# Patient Record
Sex: FEMALE | Race: BLACK OR AFRICAN AMERICAN | NOT HISPANIC OR LATINO | Employment: FULL TIME | URBAN - METROPOLITAN AREA
[De-identification: names, ages, dates, MRNs, and addresses within clinical notes are randomized per-mention and may not be internally consistent; named-entity substitution may affect disease eponyms.]

---

## 2019-05-16 PROBLEM — H40.1133 PRIMARY OPEN ANGLE GLAUCOMA (POAG) OF BOTH EYES, SEVERE STAGE: Status: ACTIVE | Noted: 2019-05-16

## 2019-05-22 PROBLEM — I50.22 CHRONIC SYSTOLIC HEART FAILURE: Status: ACTIVE | Noted: 2019-05-22

## 2019-11-22 PROBLEM — K21.9 GASTROESOPHAGEAL REFLUX DISEASE: Status: ACTIVE | Noted: 2019-11-22

## 2019-11-22 PROBLEM — I10 HYPERTENSION: Status: ACTIVE | Noted: 2019-11-22

## 2020-03-20 PROBLEM — E78.5 DYSLIPIDEMIA: Status: ACTIVE | Noted: 2020-03-20

## 2020-10-17 PROBLEM — I50.21 ACUTE SYSTOLIC CONGESTIVE HEART FAILURE: Status: ACTIVE | Noted: 2019-05-22

## 2020-10-17 PROBLEM — H05.019 CELLULITIS OF ORBIT: Status: ACTIVE | Noted: 2018-03-08

## 2020-10-17 PROBLEM — N95.1 MENOPAUSAL VASOMOTOR SYNDROME: Status: ACTIVE | Noted: 2018-03-05

## 2020-10-17 PROBLEM — N95.2 ATROPHIC VAGINITIS: Status: ACTIVE | Noted: 2018-03-05

## 2020-10-17 PROBLEM — H05.019 CELLULITIS OF ORBIT: Status: RESOLVED | Noted: 2018-03-08 | Resolved: 2020-10-17

## 2020-10-17 PROBLEM — R94.39 ABNORMAL STRESS TEST: Status: ACTIVE | Noted: 2020-10-17

## 2020-10-17 PROBLEM — I50.21 ACUTE SYSTOLIC CONGESTIVE HEART FAILURE: Status: RESOLVED | Noted: 2019-05-22 | Resolved: 2020-10-17

## 2020-10-17 PROBLEM — Z95.810 ICD (IMPLANTABLE CARDIOVERTER-DEFIBRILLATOR) IN PLACE: Status: ACTIVE | Noted: 2020-10-17

## 2020-10-17 PROBLEM — L03.213 PRESEPTAL CELLULITIS: Status: ACTIVE | Noted: 2020-10-17

## 2020-10-17 PROBLEM — R00.1 BRADYCARDIA: Status: ACTIVE | Noted: 2020-10-17

## 2020-10-17 PROBLEM — R05.9 COUGH: Status: ACTIVE | Noted: 2020-10-17

## 2020-10-17 PROBLEM — R05.9 COUGH: Status: RESOLVED | Noted: 2020-10-17 | Resolved: 2020-10-17

## 2020-10-17 PROBLEM — R00.1 BRADYCARDIA: Status: RESOLVED | Noted: 2020-10-17 | Resolved: 2020-10-17

## 2020-10-17 PROBLEM — I50.9 CHF (CONGESTIVE HEART FAILURE): Status: ACTIVE | Noted: 2017-10-10

## 2020-10-17 PROBLEM — I42.9 CARDIOMYOPATHY: Status: ACTIVE | Noted: 2020-10-17

## 2020-10-17 PROBLEM — I50.9 CHF EXACERBATION: Status: ACTIVE | Noted: 2020-10-17

## 2020-10-18 PROBLEM — R10.9 ABDOMINAL PAIN: Status: ACTIVE | Noted: 2020-10-18

## 2020-10-20 PROBLEM — N17.9 AKI (ACUTE KIDNEY INJURY): Status: ACTIVE | Noted: 2020-10-20

## 2020-12-19 PROBLEM — H25.813 COMBINED FORMS OF AGE-RELATED CATARACT OF BOTH EYES: Status: ACTIVE | Noted: 2020-12-19

## 2020-12-19 PROBLEM — H40.1112 PRIMARY OPEN ANGLE GLAUCOMA (POAG) OF RIGHT EYE, MODERATE STAGE: Status: ACTIVE | Noted: 2020-12-19

## 2020-12-19 PROBLEM — H25.812 COMBINED FORMS OF AGE-RELATED CATARACT OF LEFT EYE: Status: ACTIVE | Noted: 2020-12-19

## 2021-01-05 PROBLEM — H25.813 COMBINED FORMS OF AGE-RELATED CATARACT OF BOTH EYES: Status: ACTIVE | Noted: 2021-01-05

## 2021-01-30 PROBLEM — H25.811 COMBINED FORMS OF AGE-RELATED CATARACT OF RIGHT EYE: Status: ACTIVE | Noted: 2021-01-05

## 2021-04-16 PROBLEM — I50.9 CHF EXACERBATION: Status: RESOLVED | Noted: 2020-10-17 | Resolved: 2021-04-16

## 2021-04-16 PROBLEM — R73.03 PREDIABETES: Status: ACTIVE | Noted: 2021-04-16

## 2021-04-16 PROBLEM — I50.9 CHF (CONGESTIVE HEART FAILURE): Status: RESOLVED | Noted: 2017-10-10 | Resolved: 2021-04-16

## 2021-04-16 PROBLEM — H25.811 COMBINED FORMS OF AGE-RELATED CATARACT OF RIGHT EYE: Status: RESOLVED | Noted: 2021-01-05 | Resolved: 2021-04-16

## 2021-07-21 ENCOUNTER — PATIENT OUTREACH (OUTPATIENT)
Dept: ADMINISTRATIVE | Facility: HOSPITAL | Age: 61
End: 2021-07-21

## 2021-10-24 PROBLEM — H26.493 POSTERIOR CAPSULAR OPACIFICATION VISUALLY SIGNIFICANT OF BOTH EYES: Status: ACTIVE | Noted: 2021-10-24

## 2021-10-24 PROBLEM — Z96.1 PSEUDOPHAKIA OF BOTH EYES: Status: ACTIVE | Noted: 2021-10-24

## 2022-08-25 PROBLEM — M79.605 PAIN IN BOTH LOWER EXTREMITIES: Status: ACTIVE | Noted: 2022-08-25

## 2022-08-25 PROBLEM — Z12.31 ENCOUNTER FOR SCREENING MAMMOGRAM FOR MALIGNANT NEOPLASM OF BREAST: Status: ACTIVE | Noted: 2022-08-25

## 2022-08-25 PROBLEM — M79.604 PAIN IN BOTH LOWER EXTREMITIES: Status: ACTIVE | Noted: 2022-08-25

## 2022-08-25 PROBLEM — F41.9 ANXIETY: Status: ACTIVE | Noted: 2022-08-25

## 2022-12-21 ENCOUNTER — PATIENT OUTREACH (OUTPATIENT)
Dept: ADMINISTRATIVE | Facility: HOSPITAL | Age: 62
End: 2022-12-21

## 2023-01-10 PROBLEM — R31.21 ASYMPTOMATIC MICROSCOPIC HEMATURIA: Status: ACTIVE | Noted: 2023-01-10

## 2023-04-11 PROBLEM — R31.29 MICROSCOPIC HEMATURIA: Status: ACTIVE | Noted: 2023-01-10

## 2023-11-03 PROBLEM — T20.20XA: Status: ACTIVE | Noted: 2023-11-03

## 2024-03-04 PROBLEM — I50.40 COMBINED SYSTOLIC AND DIASTOLIC CONGESTIVE HEART FAILURE: Status: ACTIVE | Noted: 2024-03-04

## 2024-03-04 PROBLEM — L03.213 PRESEPTAL CELLULITIS: Status: RESOLVED | Noted: 2020-10-17 | Resolved: 2024-03-04

## 2024-03-04 PROBLEM — N17.9 AKI (ACUTE KIDNEY INJURY): Status: RESOLVED | Noted: 2020-10-20 | Resolved: 2024-03-04

## 2024-09-05 PROBLEM — M79.605 PAIN IN BOTH LOWER EXTREMITIES: Status: RESOLVED | Noted: 2022-08-25 | Resolved: 2024-09-05

## 2024-09-05 PROBLEM — T20.20XA: Status: RESOLVED | Noted: 2023-11-03 | Resolved: 2024-09-05

## 2024-09-05 PROBLEM — R10.9 ABDOMINAL PAIN: Status: RESOLVED | Noted: 2020-10-18 | Resolved: 2024-09-05

## 2024-09-05 PROBLEM — M79.604 PAIN IN BOTH LOWER EXTREMITIES: Status: RESOLVED | Noted: 2022-08-25 | Resolved: 2024-09-05

## 2025-01-06 PROBLEM — Z23 FLU VACCINE NEED: Status: ACTIVE | Noted: 2025-01-06

## 2025-01-06 PROBLEM — R31.29 MICROSCOPIC HEMATURIA: Status: RESOLVED | Noted: 2023-01-10 | Resolved: 2025-01-06

## 2025-01-06 PROBLEM — N95.2 ATROPHIC VAGINITIS: Status: RESOLVED | Noted: 2018-03-05 | Resolved: 2025-01-06

## 2025-01-06 PROBLEM — J11.1 INFLUENZA-LIKE ILLNESS: Status: ACTIVE | Noted: 2025-01-06

## 2025-01-06 PROBLEM — R94.39 ABNORMAL STRESS TEST: Status: RESOLVED | Noted: 2020-10-17 | Resolved: 2025-01-06

## 2025-01-06 PROBLEM — N95.1 MENOPAUSAL VASOMOTOR SYNDROME: Status: RESOLVED | Noted: 2018-03-05 | Resolved: 2025-01-06

## 2025-01-06 PROBLEM — J10.1 INFLUENZA A: Status: ACTIVE | Noted: 2025-01-06

## 2025-03-11 ENCOUNTER — PATIENT OUTREACH (OUTPATIENT)
Facility: OTHER | Age: 65
End: 2025-03-11

## 2025-03-12 NOTE — PROGRESS NOTES
Patient was seen in the ED on 3/10/25. Phoned patient on 2 separate occasions to assist with Post ED Discharge Navigation. Patient was unavailable. Encounter closed.  Cynthia Headley

## 2025-07-14 PROBLEM — N18.31 CHRONIC KIDNEY DISEASE, STAGE 3A: Status: ACTIVE | Noted: 2025-07-14

## 2025-07-16 PROBLEM — L30.9 ACUTE DERMATITIS: Status: ACTIVE | Noted: 2025-07-16

## 2025-08-02 PROBLEM — R07.2 PRECORDIAL PAIN: Status: ACTIVE | Noted: 2025-08-02

## 2025-08-03 PROBLEM — N18.9 CKD (CHRONIC KIDNEY DISEASE): Status: ACTIVE | Noted: 2025-07-14

## 2025-08-05 ENCOUNTER — PATIENT OUTREACH (OUTPATIENT)
Dept: ADMINISTRATIVE | Facility: CLINIC | Age: 65
End: 2025-08-05

## 2025-08-05 NOTE — PROGRESS NOTES
C3 nurse attempted to contact Ivette Crump for a TCC post hospital discharge follow up call. LVM . The patient has a scheduled HOSFU appointment with [facility] on 08/08/25 @ 1774